# Patient Record
Sex: FEMALE | Race: ASIAN | NOT HISPANIC OR LATINO | URBAN - METROPOLITAN AREA
[De-identification: names, ages, dates, MRNs, and addresses within clinical notes are randomized per-mention and may not be internally consistent; named-entity substitution may affect disease eponyms.]

---

## 2021-07-19 ENCOUNTER — OFFICE VISIT (OUTPATIENT)
Dept: URBAN - METROPOLITAN AREA CLINIC 37 | Facility: CLINIC | Age: 51
End: 2021-07-19

## 2021-07-19 VITALS — WEIGHT: 125 LBS | HEIGHT: 65 IN | BODY MASS INDEX: 20.83 KG/M2

## 2021-07-19 PROBLEM — 34713006: Status: ACTIVE | Noted: 2021-07-19

## 2021-07-19 PROBLEM — 275978004 SCREENING FOR MALIGNANT NEOPLASM OF COLON: Status: ACTIVE | Noted: 2021-07-19

## 2021-07-19 RX ORDER — SODIUM, POTASSIUM,MAG SULFATES 17.5-3.13G
177ML SOLUTION, RECONSTITUTED, ORAL ORAL ONCE
Qty: 1 KIT | Refills: 0 | OUTPATIENT
Start: 2021-07-19

## 2021-07-19 NOTE — HPI-MIGRATED HPI
Colorectal cancer screening : Patient is here for initial consultation for -> first time screening colonoscopy;   Colorectal cancer screening : Patients denies -> rectal bleeding, change in bowel habits, constipation, diarrhea, or abdominal pain;   Colorectal cancer screening : Current bowel movement patterns -> One soft BM daily;   Colorectal cancer screening : Family history of GI malignancy: -> Denies;     Colorectal cancer screening : Denies dysphagia or odynophagia Currently taking anticoagulants/NSAIDs: No;

## 2021-07-21 ENCOUNTER — LAB OUTSIDE AN ENCOUNTER (OUTPATIENT)
Dept: URBAN - METROPOLITAN AREA CLINIC 37 | Facility: CLINIC | Age: 51
End: 2021-07-21

## 2021-09-27 ENCOUNTER — OFFICE VISIT (OUTPATIENT)
Dept: URBAN - METROPOLITAN AREA MEDICAL CENTER 10 | Facility: MEDICAL CENTER | Age: 51
End: 2021-09-27

## 2021-10-04 ENCOUNTER — DASHBOARD ENCOUNTERS (OUTPATIENT)
Age: 51
End: 2021-10-04

## 2021-10-11 ENCOUNTER — OFFICE VISIT (OUTPATIENT)
Dept: URBAN - METROPOLITAN AREA CLINIC 37 | Facility: CLINIC | Age: 51
End: 2021-10-11

## 2021-10-11 PROBLEM — 721704005: Status: ACTIVE | Noted: 2021-10-11

## 2021-10-11 PROBLEM — 39772007: Status: ACTIVE | Noted: 2021-10-11

## 2021-10-11 PROBLEM — 87522002: Status: ACTIVE | Noted: 2021-10-11

## 2021-10-11 RX ORDER — CHLORHEXIDINE GLUCONATE 4 %
1 TABLET LIQUID (ML) TOPICAL BID
Status: ACTIVE | COMMUNITY

## 2021-10-11 RX ORDER — DOCUSATE SODIUM 100 MG/1
2 CAPSULES CAPSULE ORAL ONCE A DAY
Qty: 60 CAPSULE | Refills: 5 | OUTPATIENT
Start: 2021-10-11

## 2021-10-11 RX ORDER — OMEGA-3/DHA/EPA/FISH OIL 60 MG-90MG
1 CAPSULE CAPSULE ORAL TWICE A DAY
Qty: 60 | Status: ACTIVE | COMMUNITY

## 2021-10-11 NOTE — HPI-MIGRATED HPI
Post-op OV : After Colonoscopy on -> 09/27/2021, at which time four small polyps were detected and resected. Histology showed they were fragments of hyperplastic polyps. Non- bleeding grade II external and internal hemorrhoids were found during retroflexion but not banded. Good bowel prep;   Post-op OV : Patient denies -> rectal bleeding, fever, nausea & vomiting since the procedure date;   Post-op OV : Patient -> denies any new changes in his/her health status since last OV.  Current BM: one soft BM daily. Stools were in greenish color. She was on iron supplement;